# Patient Record
Sex: FEMALE | Race: WHITE | NOT HISPANIC OR LATINO
[De-identification: names, ages, dates, MRNs, and addresses within clinical notes are randomized per-mention and may not be internally consistent; named-entity substitution may affect disease eponyms.]

---

## 2018-02-05 ENCOUNTER — RX RENEWAL (OUTPATIENT)
Age: 53
End: 2018-02-05

## 2018-02-13 ENCOUNTER — RECORD ABSTRACTING (OUTPATIENT)
Age: 53
End: 2018-02-13

## 2018-03-20 ENCOUNTER — APPOINTMENT (OUTPATIENT)
Dept: CARDIOLOGY | Facility: CLINIC | Age: 53
End: 2018-03-20
Payer: MEDICAID

## 2018-03-20 VITALS
HEART RATE: 70 BPM | WEIGHT: 112.31 LBS | SYSTOLIC BLOOD PRESSURE: 120 MMHG | HEIGHT: 61 IN | RESPIRATION RATE: 16 BRPM | BODY MASS INDEX: 21.2 KG/M2 | DIASTOLIC BLOOD PRESSURE: 70 MMHG

## 2018-03-20 DIAGNOSIS — G89.29 DORSALGIA, UNSPECIFIED: ICD-10-CM

## 2018-03-20 DIAGNOSIS — F17.200 NICOTINE DEPENDENCE, UNSPECIFIED, UNCOMPLICATED: ICD-10-CM

## 2018-03-20 DIAGNOSIS — M54.9 DORSALGIA, UNSPECIFIED: ICD-10-CM

## 2018-03-20 PROCEDURE — 93000 ELECTROCARDIOGRAM COMPLETE: CPT

## 2018-03-20 PROCEDURE — 99214 OFFICE O/P EST MOD 30 MIN: CPT

## 2018-03-20 RX ORDER — IBUPROFEN 400 MG/1
400 TABLET, FILM COATED ORAL
Refills: 0 | Status: DISCONTINUED | COMMUNITY
End: 2018-03-20

## 2018-03-20 RX ORDER — METOPROLOL TARTRATE 50 MG/1
50 TABLET, FILM COATED ORAL
Qty: 120 | Refills: 0 | Status: DISCONTINUED | COMMUNITY
End: 2018-03-20

## 2018-06-20 ENCOUNTER — MEDICATION RENEWAL (OUTPATIENT)
Age: 53
End: 2018-06-20

## 2018-10-12 ENCOUNTER — CLINICAL ADVICE (OUTPATIENT)
Age: 53
End: 2018-10-12

## 2019-01-08 ENCOUNTER — RX RENEWAL (OUTPATIENT)
Age: 54
End: 2019-01-08

## 2019-03-27 ENCOUNTER — RECORD ABSTRACTING (OUTPATIENT)
Age: 54
End: 2019-03-27

## 2019-03-27 RX ORDER — CLONAZEPAM 0.5 MG/1
0.5 TABLET ORAL
Refills: 0 | Status: ACTIVE | COMMUNITY

## 2019-03-27 RX ORDER — FENTANYL 87.5 UG/H
PATCH, EXTENDED RELEASE TRANSDERMAL
Refills: 0 | Status: ACTIVE | COMMUNITY

## 2019-03-28 ENCOUNTER — APPOINTMENT (OUTPATIENT)
Dept: CARDIOLOGY | Facility: CLINIC | Age: 54
End: 2019-03-28
Payer: MEDICAID

## 2019-03-28 ENCOUNTER — NON-APPOINTMENT (OUTPATIENT)
Age: 54
End: 2019-03-28

## 2019-03-28 VITALS
SYSTOLIC BLOOD PRESSURE: 115 MMHG | BODY MASS INDEX: 21.52 KG/M2 | RESPIRATION RATE: 16 BRPM | WEIGHT: 114 LBS | DIASTOLIC BLOOD PRESSURE: 80 MMHG | HEIGHT: 61 IN | HEART RATE: 57 BPM

## 2019-03-28 PROCEDURE — 93000 ELECTROCARDIOGRAM COMPLETE: CPT

## 2019-03-28 PROCEDURE — 99214 OFFICE O/P EST MOD 30 MIN: CPT

## 2019-03-28 NOTE — ASSESSMENT
[FreeTextEntry1] : ECG: Normal sinus rhythm at 57 LAE,  beats per minute. Normal axis and intervals. No significant ischemic changes\par \par Laboratory data 3/22/19:\par Cholesterol 210\par HDL 51\par \par A1c 5.7\par Electrolytes LFTs are normal.\par \par Impression\par Symptomatic chronic PVCs.\par Atypical chronic chest pain syndrome.\par Continued smoking.\par Anxiety and depressive disorder.\par No improvement in lipid status with atorvastatin because of extreme inconsistency with dosing.\par \par Plan:\par Discuss with the patient to be imperative for her being consistent with her atorvastatin. We will then repeat the labs in a couple months to see what the efficacy is. This is especially important in view of this and a strong family history of premature CAD.\par Reassured the patient about the benign nature of the atypical musculoskeletal chest pain.\par Continuation of the BB therapy that has been helpful\par We'll schedule an echocardiogram in followup in a few months.

## 2019-03-28 NOTE — HISTORY OF PRESENT ILLNESS
[FreeTextEntry1] : Still grieving the premature death of her 26-year-old son.\par Believes that the metoprolol 50 q.i.d. is helping though not not sufficient to completely control her palpitations. "skipping and racing, usually worse with stress\par No other new active cardiac symptoms.\par As continued baseline exertional shortness of breath and the same atypical musculoskeletal chest pain.

## 2019-03-28 NOTE — PHYSICAL EXAM
[General Appearance - Well Developed] : well developed [Normal Appearance] : normal appearance [Well Groomed] : well groomed [General Appearance - Well Nourished] : well nourished [No Deformities] : no deformities [General Appearance - In No Acute Distress] : no acute distress [Normal Conjunctiva] : the conjunctiva exhibited no abnormalities [Eyelids - No Xanthelasma] : the eyelids demonstrated no xanthelasmas [Normal Oral Mucosa] : normal oral mucosa [No Oral Pallor] : no oral pallor [No Oral Cyanosis] : no oral cyanosis [Normal Jugular Venous A Waves Present] : normal jugular venous A waves present [Normal Jugular Venous V Waves Present] : normal jugular venous V waves present [No Jugular Venous Alva A Waves] : no jugular venous alva A waves [Respiration, Rhythm And Depth] : normal respiratory rhythm and effort [Exaggerated Use Of Accessory Muscles For Inspiration] : no accessory muscle use [Auscultation Breath Sounds / Voice Sounds] : lungs were clear to auscultation bilaterally [Abdomen Soft] : soft [Abdomen Tenderness] : non-tender [Abdomen Mass (___ Cm)] : no abdominal mass palpated [Abnormal Walk] : normal gait [Gait - Sufficient For Exercise Testing] : the gait was sufficient for exercise testing [Nail Clubbing] : no clubbing of the fingernails [Cyanosis, Localized] : no localized cyanosis [Petechial Hemorrhages (___cm)] : no petechial hemorrhages [Skin Color & Pigmentation] : normal skin color and pigmentation [] : no rash [No Venous Stasis] : no venous stasis [Skin Lesions] : no skin lesions [No Skin Ulcers] : no skin ulcer [No Xanthoma] : no  xanthoma was observed [Oriented To Time, Place, And Person] : oriented to person, place, and time [Affect] : the affect was normal [Mood] : the mood was normal [FreeTextEntry1] : very anxious

## 2019-03-28 NOTE — REASON FOR VISIT
[FreeTextEntry1] : Patient returns here for followup with regard to management of problems which include:\par \par 1. Chronic palpitations/PVC\par \par 2. Anxiety and depressive disorder\par \par 3. Chronic smoking\par \par 4. Chronic atypical chest pain syndrome\par

## 2019-05-09 ENCOUNTER — RX CHANGE (OUTPATIENT)
Age: 54
End: 2019-05-09

## 2019-05-09 ENCOUNTER — APPOINTMENT (OUTPATIENT)
Dept: CARDIOLOGY | Facility: CLINIC | Age: 54
End: 2019-05-09
Payer: MEDICAID

## 2019-05-09 PROCEDURE — 93306 TTE W/DOPPLER COMPLETE: CPT

## 2019-07-05 ENCOUNTER — RX RENEWAL (OUTPATIENT)
Age: 54
End: 2019-07-05

## 2019-08-01 ENCOUNTER — APPOINTMENT (OUTPATIENT)
Dept: GASTROENTEROLOGY | Facility: CLINIC | Age: 54
End: 2019-08-01
Payer: MEDICAID

## 2019-08-01 VITALS
SYSTOLIC BLOOD PRESSURE: 125 MMHG | BODY MASS INDEX: 21.71 KG/M2 | DIASTOLIC BLOOD PRESSURE: 80 MMHG | HEART RATE: 67 BPM | HEIGHT: 61 IN | WEIGHT: 115 LBS

## 2019-08-01 DIAGNOSIS — K76.89 OTHER SPECIFIED DISEASES OF LIVER: ICD-10-CM

## 2019-08-01 DIAGNOSIS — H53.2 DIPLOPIA: ICD-10-CM

## 2019-08-01 DIAGNOSIS — K64.9 UNSPECIFIED HEMORRHOIDS: ICD-10-CM

## 2019-08-01 DIAGNOSIS — R51 HEADACHE: ICD-10-CM

## 2019-08-01 DIAGNOSIS — R20.0 ANESTHESIA OF SKIN: ICD-10-CM

## 2019-08-01 DIAGNOSIS — F43.9 REACTION TO SEVERE STRESS, UNSPECIFIED: ICD-10-CM

## 2019-08-01 DIAGNOSIS — M54.2 CERVICALGIA: ICD-10-CM

## 2019-08-01 DIAGNOSIS — H57.10 OCULAR PAIN, UNSPECIFIED EYE: ICD-10-CM

## 2019-08-01 DIAGNOSIS — R10.84 GENERALIZED ABDOMINAL PAIN: ICD-10-CM

## 2019-08-01 DIAGNOSIS — Z72.0 TOBACCO USE: ICD-10-CM

## 2019-08-01 DIAGNOSIS — K58.1 IRRITABLE BOWEL SYNDROME WITH CONSTIPATION: ICD-10-CM

## 2019-08-01 DIAGNOSIS — J30.1 ALLERGIC RHINITIS DUE TO POLLEN: ICD-10-CM

## 2019-08-01 DIAGNOSIS — R42 DIZZINESS AND GIDDINESS: ICD-10-CM

## 2019-08-01 DIAGNOSIS — J34.9 UNSPECIFIED DISORDER OF NOSE AND NASAL SINUSES: ICD-10-CM

## 2019-08-01 DIAGNOSIS — Z80.9 FAMILY HISTORY OF MALIGNANT NEOPLASM, UNSPECIFIED: ICD-10-CM

## 2019-08-01 DIAGNOSIS — R25.1 TREMOR, UNSPECIFIED: ICD-10-CM

## 2019-08-01 DIAGNOSIS — M25.50 PAIN IN UNSPECIFIED JOINT: ICD-10-CM

## 2019-08-01 DIAGNOSIS — K59.00 CONSTIPATION, UNSPECIFIED: ICD-10-CM

## 2019-08-01 DIAGNOSIS — R06.2 WHEEZING: ICD-10-CM

## 2019-08-01 DIAGNOSIS — R35.0 FREQUENCY OF MICTURITION: ICD-10-CM

## 2019-08-01 DIAGNOSIS — F32.9 MAJOR DEPRESSIVE DISORDER, SINGLE EPISODE, UNSPECIFIED: ICD-10-CM

## 2019-08-01 DIAGNOSIS — H53.8 OTHER VISUAL DISTURBANCES: ICD-10-CM

## 2019-08-01 DIAGNOSIS — R10.11 RIGHT UPPER QUADRANT PAIN: ICD-10-CM

## 2019-08-01 DIAGNOSIS — K30 FUNCTIONAL DYSPEPSIA: ICD-10-CM

## 2019-08-01 DIAGNOSIS — K90.49 MALABSORPTION DUE TO INTOLERANCE, NOT ELSEWHERE CLASSIFIED: ICD-10-CM

## 2019-08-01 DIAGNOSIS — L81.8 OTHER SPECIFIED DISORDERS OF PIGMENTATION: ICD-10-CM

## 2019-08-01 DIAGNOSIS — T78.40XA ALLERGY, UNSPECIFIED, INITIAL ENCOUNTER: ICD-10-CM

## 2019-08-01 PROCEDURE — 99203 OFFICE O/P NEW LOW 30 MIN: CPT

## 2019-08-01 RX ORDER — LINACLOTIDE 145 UG/1
145 CAPSULE, GELATIN COATED ORAL
Qty: 30 | Refills: 5 | Status: ACTIVE | COMMUNITY
Start: 2019-08-01 | End: 1900-01-01

## 2019-08-04 PROBLEM — K59.00 CONSTIPATION, UNSPECIFIED CONSTIPATION TYPE: Status: ACTIVE | Noted: 2019-08-01

## 2019-08-04 PROBLEM — F43.9 STRESS: Status: ACTIVE | Noted: 2019-08-01

## 2019-08-04 PROBLEM — R51 HEADACHE: Status: ACTIVE | Noted: 2019-08-01

## 2019-08-04 PROBLEM — T78.40XA ALLERGY: Status: ACTIVE | Noted: 2019-08-01

## 2019-08-04 PROBLEM — H57.10 PAIN IN EYE: Status: ACTIVE | Noted: 2019-08-01

## 2019-08-04 PROBLEM — H53.2 DOUBLE VISION: Status: ACTIVE | Noted: 2019-08-01

## 2019-08-04 PROBLEM — Z80.9 FAMILY HISTORY OF MALIGNANT NEOPLASM: Status: ACTIVE | Noted: 2019-08-01

## 2019-08-04 PROBLEM — L81.8 HISTORY OF TATTOO: Status: ACTIVE | Noted: 2019-08-01

## 2019-08-04 PROBLEM — R06.2 WHEEZING: Status: ACTIVE | Noted: 2019-08-01

## 2019-08-04 PROBLEM — R10.84 GENERALIZED ABDOMINAL PAIN: Status: ACTIVE | Noted: 2019-08-01

## 2019-08-04 PROBLEM — J34.9 SINUS PROBLEM: Status: ACTIVE | Noted: 2019-08-01

## 2019-08-04 PROBLEM — R25.1 TREMORS OF NERVOUS SYSTEM: Status: ACTIVE | Noted: 2019-08-01

## 2019-08-04 PROBLEM — M25.50 JOINT PAIN: Status: ACTIVE | Noted: 2019-08-01

## 2019-08-04 PROBLEM — R20.0 NUMBNESS: Status: ACTIVE | Noted: 2019-08-01

## 2019-08-04 PROBLEM — J30.1 HAY FEVER: Status: ACTIVE | Noted: 2019-08-01

## 2019-08-04 PROBLEM — F32.9 DEPRESSION, UNSPECIFIED DEPRESSION TYPE: Status: ACTIVE | Noted: 2019-08-01

## 2019-08-04 PROBLEM — K30 INDIGESTION: Status: ACTIVE | Noted: 2019-08-01

## 2019-08-04 PROBLEM — R35.0 URINARY FREQUENCY: Status: ACTIVE | Noted: 2019-08-01

## 2019-08-04 PROBLEM — H53.8 BLURRED VISION: Status: ACTIVE | Noted: 2019-08-01

## 2019-08-04 PROBLEM — K90.49 FOOD INTOLERANCE: Status: ACTIVE | Noted: 2019-08-01

## 2019-08-04 PROBLEM — M54.2 NECK PAIN: Status: ACTIVE | Noted: 2019-08-01

## 2019-08-04 PROBLEM — R42 DIZZINESS: Status: ACTIVE | Noted: 2019-08-01

## 2019-08-04 PROBLEM — Z72.0 TOBACCO USE: Status: ACTIVE | Noted: 2019-08-01

## 2019-08-04 PROBLEM — K64.9 HEMORRHOIDS, UNSPECIFIED HEMORRHOID TYPE: Status: ACTIVE | Noted: 2019-08-01

## 2019-08-04 NOTE — REVIEW OF SYSTEMS
[Eyesight Problems] : eyesight problems [Dry Eyes] : no dryness of the eyes [Eyes Itch] : no itching of the eyes [Wheezing] : wheezing [As Noted in HPI] : as noted in HPI [Arthralgias] : arthralgias [Joint Pain] : joint pain [Joint Swelling] : joint swelling [Joint Stiffness] : joint stiffness [Limb Pain] : limb pain [Limb Swelling] : no limb swelling [Negative] : Psychiatric [FreeTextEntry6] : at times

## 2019-08-04 NOTE — HISTORY OF PRESENT ILLNESS
[de-identified] : 54yo female with constipation-predominant IBS\par She has tried mulitple OTC meds but still with issues having BM\par She notes new ruq pain on and off exacerbated by food\par Pain comes and goes, can occur up to few times per week and then resolve by itself after hours with some residual soreness

## 2019-08-04 NOTE — ASSESSMENT
[FreeTextEntry1] : 52yo female with IBS constipation predominant IBS\par \par try linzess - d/w pt in detail and will titrate to response\par Pt counseled regarding possibility of diarrhea\par RUQ pain - will check sono, evaluate for biliary disease

## 2019-08-04 NOTE — PHYSICAL EXAM
[General Appearance - Alert] : alert [General Appearance - In No Acute Distress] : in no acute distress [Sclera] : the sclera and conjunctiva were normal [PERRL With Normal Accommodation] : pupils were equal in size, round, and reactive to light [Extraocular Movements] : extraocular movements were intact [Outer Ear] : the ears and nose were normal in appearance [Oropharynx] : the oropharynx was normal [Auscultation Breath Sounds / Voice Sounds] : lungs were clear to auscultation bilaterally [Heart Rate And Rhythm] : heart rate was normal and rhythm regular [Heart Sounds] : normal S1 and S2 [Heart Sounds Gallop] : no gallops [Murmurs] : no murmurs [Heart Sounds Pericardial Friction Rub] : no pericardial rub [Full Pulse] : the pedal pulses are present [Edema] : there was no peripheral edema [Bowel Sounds] : normal bowel sounds [Abdomen Soft] : soft [Abdomen Tenderness] : non-tender [Abdomen Mass (___ Cm)] : no abdominal mass palpated [Abnormal Walk] : normal gait [Nail Clubbing] : no clubbing  or cyanosis of the fingernails [Musculoskeletal - Swelling] : no joint swelling seen [Motor Tone] : muscle strength and tone were normal [Skin Color & Pigmentation] : normal skin color and pigmentation [Skin Turgor] : normal skin turgor [] : no rash [Oriented To Time, Place, And Person] : oriented to person, place, and time [Impaired Insight] : insight and judgment were intact [Affect] : the affect was normal

## 2019-08-07 ENCOUNTER — RX CHANGE (OUTPATIENT)
Age: 54
End: 2019-08-07

## 2019-09-17 ENCOUNTER — RECORD ABSTRACTING (OUTPATIENT)
Age: 54
End: 2019-09-17

## 2019-09-18 ENCOUNTER — APPOINTMENT (OUTPATIENT)
Dept: CARDIOLOGY | Facility: CLINIC | Age: 54
End: 2019-09-18
Payer: MEDICAID

## 2019-09-18 ENCOUNTER — NON-APPOINTMENT (OUTPATIENT)
Age: 54
End: 2019-09-18

## 2019-09-18 VITALS
BODY MASS INDEX: 22.09 KG/M2 | HEART RATE: 61 BPM | SYSTOLIC BLOOD PRESSURE: 105 MMHG | HEIGHT: 61 IN | OXYGEN SATURATION: 98 % | WEIGHT: 117 LBS | RESPIRATION RATE: 16 BRPM | DIASTOLIC BLOOD PRESSURE: 70 MMHG

## 2019-09-18 PROCEDURE — 99214 OFFICE O/P EST MOD 30 MIN: CPT

## 2019-09-18 PROCEDURE — 93000 ELECTROCARDIOGRAM COMPLETE: CPT

## 2019-09-18 NOTE — PHYSICAL EXAM
[General Appearance - Well Developed] : well developed [Normal Appearance] : normal appearance [Well Groomed] : well groomed [General Appearance - Well Nourished] : well nourished [No Deformities] : no deformities [General Appearance - In No Acute Distress] : no acute distress [Normal Conjunctiva] : the conjunctiva exhibited no abnormalities [Eyelids - No Xanthelasma] : the eyelids demonstrated no xanthelasmas [Normal Oral Mucosa] : normal oral mucosa [No Oral Pallor] : no oral pallor [No Oral Cyanosis] : no oral cyanosis [Normal Jugular Venous A Waves Present] : normal jugular venous A waves present [No Jugular Venous Alva A Waves] : no jugular venous alva A waves [Normal Jugular Venous V Waves Present] : normal jugular venous V waves present [Exaggerated Use Of Accessory Muscles For Inspiration] : no accessory muscle use [Respiration, Rhythm And Depth] : normal respiratory rhythm and effort [Auscultation Breath Sounds / Voice Sounds] : lungs were clear to auscultation bilaterally [Abdomen Soft] : soft [Abdomen Tenderness] : non-tender [Abnormal Walk] : normal gait [Abdomen Mass (___ Cm)] : no abdominal mass palpated [Gait - Sufficient For Exercise Testing] : the gait was sufficient for exercise testing [Nail Clubbing] : no clubbing of the fingernails [Cyanosis, Localized] : no localized cyanosis [Petechial Hemorrhages (___cm)] : no petechial hemorrhages [Skin Color & Pigmentation] : normal skin color and pigmentation [] : no rash [No Venous Stasis] : no venous stasis [Skin Lesions] : no skin lesions [No Skin Ulcers] : no skin ulcer [No Xanthoma] : no  xanthoma was observed [Oriented To Time, Place, And Person] : oriented to person, place, and time [Affect] : the affect was normal [Mood] : the mood was normal [FreeTextEntry1] : very anxious

## 2019-09-18 NOTE — ASSESSMENT
[FreeTextEntry1] : ECG: Normal sinus rhythm at 61 , LVH No significant ischemic changes\par \par \par Echocardiogram 5/9/19\par EF 60-65%\par Normal LV function\par False tendon in the LV apical cavity\par \par \par \par Laboratory data       3/22/19              5/8/19\par Cholesterol                  210                     145\par HDL                               51                      48\par LDL                              141                     84\par A1c                               5.7\par Electrolytes LFTs are normal.\par \par Impression\par -Minimally symptomatic chronic PVCs and palpitations.\par -Atypical chronic chest pain syndrome.\par -Continued smoking and aware of the consequences. \par -Anxiety and depressive disorder.\par -Much improvement noted in her lipid panel with the use of Atorvastatin 10 mg QD.\par -Blood pressure today 105/70.\par - Echo stable with mild valvular insufficiences\par \par Plan:\par 1.Discussed with the patient to be imperative for her being consistent with her atorvastatin. \par 2.Reassured the patient about the benign nature of the atypical musculoskeletal chest pain.\par 3.Continuation of the BB therapy \par 4. Smoking cessation\par 5. Repeat blood work to reassess lipids in 4 months\par \par Clinical follow up in 6 months \par

## 2019-09-18 NOTE — HISTORY OF PRESENT ILLNESS
[FreeTextEntry1] : No other new active cardiac symptoms.\par \par Continues to smoke daily less than 0.5 PPD, now being managed by pulm. \par \par Newly diagnosed with rheumatoid arthritis and osteoporosis.\par \par As continued baseline exertional shortness of breath and the same atypical musculoskeletal chest pain.\par \par States Inderal improved her palpitations better then current Metoprolol. She is experiencing several episodes during the day with some minor SOB and lightheadedness. Inderal changed in 2014.\par \par Her lightheadedness seems to happen with rapid change in position as she describes it. Has attempted to monitor her blood pressures soon after with no abnormal readings. Explained blood pressure readings can be transient.\par \par Denies edema or chest discomfort at rest or exertion.

## 2020-01-29 ENCOUNTER — RX RENEWAL (OUTPATIENT)
Age: 55
End: 2020-01-29

## 2020-01-29 RX ORDER — ATORVASTATIN CALCIUM 10 MG/1
10 TABLET, FILM COATED ORAL
Qty: 90 | Refills: 3 | Status: ACTIVE | COMMUNITY
Start: 2018-10-12 | End: 1900-01-01

## 2020-03-19 ENCOUNTER — APPOINTMENT (OUTPATIENT)
Dept: CARDIOLOGY | Facility: CLINIC | Age: 55
End: 2020-03-19

## 2020-06-10 ENCOUNTER — RX RENEWAL (OUTPATIENT)
Age: 55
End: 2020-06-10

## 2020-06-18 NOTE — PHYSICAL EXAM
[General Appearance - Well Developed] : well developed [Normal Appearance] : normal appearance [General Appearance - Well Nourished] : well nourished [Well Groomed] : well groomed [No Deformities] : no deformities [General Appearance - In No Acute Distress] : no acute distress [Eyelids - No Xanthelasma] : the eyelids demonstrated no xanthelasmas [Normal Conjunctiva] : the conjunctiva exhibited no abnormalities [Normal Oral Mucosa] : normal oral mucosa [No Oral Pallor] : no oral pallor [No Oral Cyanosis] : no oral cyanosis [Normal Jugular Venous A Waves Present] : normal jugular venous A waves present [Normal Jugular Venous V Waves Present] : normal jugular venous V waves present [No Jugular Venous Alva A Waves] : no jugular venous alva A waves [Respiration, Rhythm And Depth] : normal respiratory rhythm and effort [Exaggerated Use Of Accessory Muscles For Inspiration] : no accessory muscle use [Auscultation Breath Sounds / Voice Sounds] : lungs were clear to auscultation bilaterally [Abdomen Soft] : soft [Abdomen Tenderness] : non-tender [Abnormal Walk] : normal gait [Abdomen Mass (___ Cm)] : no abdominal mass palpated [Gait - Sufficient For Exercise Testing] : the gait was sufficient for exercise testing [Nail Clubbing] : no clubbing of the fingernails [Cyanosis, Localized] : no localized cyanosis [Petechial Hemorrhages (___cm)] : no petechial hemorrhages [Skin Color & Pigmentation] : normal skin color and pigmentation [No Venous Stasis] : no venous stasis [] : no rash [Skin Lesions] : no skin lesions [No Skin Ulcers] : no skin ulcer [No Xanthoma] : no  xanthoma was observed [Oriented To Time, Place, And Person] : oriented to person, place, and time [Affect] : the affect was normal [Mood] : the mood was normal [FreeTextEntry1] : very anxious

## 2020-06-22 ENCOUNTER — APPOINTMENT (OUTPATIENT)
Dept: CARDIOLOGY | Facility: CLINIC | Age: 55
End: 2020-06-22

## 2020-06-22 RX ORDER — CLONAZEPAM 0.5 MG/1
0.5 TABLET ORAL
Refills: 0 | Status: DISCONTINUED | COMMUNITY
End: 2020-06-22

## 2020-06-22 NOTE — PHYSICAL EXAM
[General Appearance - Well Developed] : well developed [Normal Appearance] : normal appearance [Well Groomed] : well groomed [No Deformities] : no deformities [General Appearance - Well Nourished] : well nourished [General Appearance - In No Acute Distress] : no acute distress [Eyelids - No Xanthelasma] : the eyelids demonstrated no xanthelasmas [Normal Conjunctiva] : the conjunctiva exhibited no abnormalities [No Oral Pallor] : no oral pallor [Normal Oral Mucosa] : normal oral mucosa [No Oral Cyanosis] : no oral cyanosis [Normal Jugular Venous V Waves Present] : normal jugular venous V waves present [Normal Jugular Venous A Waves Present] : normal jugular venous A waves present [Respiration, Rhythm And Depth] : normal respiratory rhythm and effort [No Jugular Venous Alva A Waves] : no jugular venous alva A waves [Auscultation Breath Sounds / Voice Sounds] : lungs were clear to auscultation bilaterally [Exaggerated Use Of Accessory Muscles For Inspiration] : no accessory muscle use [Abdomen Soft] : soft [Abdomen Mass (___ Cm)] : no abdominal mass palpated [Abdomen Tenderness] : non-tender [Gait - Sufficient For Exercise Testing] : the gait was sufficient for exercise testing [Abnormal Walk] : normal gait [Petechial Hemorrhages (___cm)] : no petechial hemorrhages [Cyanosis, Localized] : no localized cyanosis [Nail Clubbing] : no clubbing of the fingernails [Skin Color & Pigmentation] : normal skin color and pigmentation [No Venous Stasis] : no venous stasis [Skin Lesions] : no skin lesions [] : no rash [Oriented To Time, Place, And Person] : oriented to person, place, and time [No Xanthoma] : no  xanthoma was observed [No Skin Ulcers] : no skin ulcer [Mood] : the mood was normal [Affect] : the affect was normal [FreeTextEntry1] : very anxious

## 2020-06-23 RX ORDER — ABALOPARATIDE 2000 UG/ML
3120 INJECTION, SOLUTION SUBCUTANEOUS
Refills: 0 | Status: ACTIVE | COMMUNITY

## 2020-06-24 ENCOUNTER — APPOINTMENT (OUTPATIENT)
Dept: CARDIOLOGY | Facility: CLINIC | Age: 55
End: 2020-06-24

## 2020-06-24 ENCOUNTER — APPOINTMENT (OUTPATIENT)
Dept: CARDIOLOGY | Facility: CLINIC | Age: 55
End: 2020-06-24
Payer: MEDICAID

## 2020-06-24 ENCOUNTER — NON-APPOINTMENT (OUTPATIENT)
Age: 55
End: 2020-06-24

## 2020-06-24 VITALS
RESPIRATION RATE: 16 BRPM | WEIGHT: 115 LBS | HEART RATE: 62 BPM | BODY MASS INDEX: 21.71 KG/M2 | SYSTOLIC BLOOD PRESSURE: 111 MMHG | DIASTOLIC BLOOD PRESSURE: 71 MMHG | HEIGHT: 61 IN

## 2020-06-24 DIAGNOSIS — Z00.00 ENCOUNTER FOR GENERAL ADULT MEDICAL EXAMINATION W/OUT ABNORMAL FINDINGS: ICD-10-CM

## 2020-06-24 PROCEDURE — 93000 ELECTROCARDIOGRAM COMPLETE: CPT

## 2020-06-24 PROCEDURE — 99214 OFFICE O/P EST MOD 30 MIN: CPT

## 2020-06-24 RX ORDER — BUPROPION HYDROCHLORIDE 150 MG/1
150 TABLET, EXTENDED RELEASE ORAL
Qty: 30 | Refills: 0 | Status: DISCONTINUED | COMMUNITY
Start: 2020-01-22 | End: 2020-06-24

## 2020-06-24 RX ORDER — BUPROPION HCL 150 MG
150 TABLET,SUSTAINED-RELEASE 12 HR ORAL
Refills: 0 | Status: DISCONTINUED | COMMUNITY
End: 2020-06-24

## 2020-06-26 NOTE — PHYSICAL EXAM
[General Appearance - Well Developed] : well developed [Well Groomed] : well groomed [Normal Appearance] : normal appearance [No Deformities] : no deformities [General Appearance - In No Acute Distress] : no acute distress [General Appearance - Well Nourished] : well nourished [Eyelids - No Xanthelasma] : the eyelids demonstrated no xanthelasmas [Normal Oral Mucosa] : normal oral mucosa [Normal Conjunctiva] : the conjunctiva exhibited no abnormalities [No Oral Cyanosis] : no oral cyanosis [No Oral Pallor] : no oral pallor [Normal Jugular Venous A Waves Present] : normal jugular venous A waves present [Normal Jugular Venous V Waves Present] : normal jugular venous V waves present [No Jugular Venous Alva A Waves] : no jugular venous alva A waves [Auscultation Breath Sounds / Voice Sounds] : lungs were clear to auscultation bilaterally [Respiration, Rhythm And Depth] : normal respiratory rhythm and effort [Exaggerated Use Of Accessory Muscles For Inspiration] : no accessory muscle use [Abdomen Soft] : soft [Abdomen Tenderness] : non-tender [Abdomen Mass (___ Cm)] : no abdominal mass palpated [Gait - Sufficient For Exercise Testing] : the gait was sufficient for exercise testing [Abnormal Walk] : normal gait [Nail Clubbing] : no clubbing of the fingernails [Petechial Hemorrhages (___cm)] : no petechial hemorrhages [Cyanosis, Localized] : no localized cyanosis [Skin Color & Pigmentation] : normal skin color and pigmentation [No Venous Stasis] : no venous stasis [] : no rash [No Skin Ulcers] : no skin ulcer [No Xanthoma] : no  xanthoma was observed [Skin Lesions] : no skin lesions [Mood] : the mood was normal [Oriented To Time, Place, And Person] : oriented to person, place, and time [Affect] : the affect was normal [FreeTextEntry1] : very anxious

## 2021-01-19 ENCOUNTER — APPOINTMENT (OUTPATIENT)
Dept: CARDIOLOGY | Facility: CLINIC | Age: 56
End: 2021-01-19
Payer: MEDICAID

## 2021-01-19 VITALS
RESPIRATION RATE: 16 BRPM | HEIGHT: 61 IN | TEMPERATURE: 96.5 F | WEIGHT: 115 LBS | DIASTOLIC BLOOD PRESSURE: 62 MMHG | OXYGEN SATURATION: 98 % | BODY MASS INDEX: 21.71 KG/M2 | HEART RATE: 68 BPM | SYSTOLIC BLOOD PRESSURE: 104 MMHG

## 2021-01-19 DIAGNOSIS — R00.2 PALPITATIONS: ICD-10-CM

## 2021-01-19 DIAGNOSIS — F41.9 ANXIETY DISORDER, UNSPECIFIED: ICD-10-CM

## 2021-01-19 PROCEDURE — 99214 OFFICE O/P EST MOD 30 MIN: CPT

## 2021-01-19 PROCEDURE — 99072 ADDL SUPL MATRL&STAF TM PHE: CPT

## 2021-01-19 PROCEDURE — 93000 ELECTROCARDIOGRAM COMPLETE: CPT

## 2021-01-19 RX ORDER — TIZANIDINE 2 MG/1
2 TABLET ORAL
Qty: 60 | Refills: 0 | Status: ACTIVE | COMMUNITY
Start: 2020-09-28

## 2021-01-19 RX ORDER — PEN NEEDLE, DIABETIC 29 G X1/2"
31G X 5 MM NEEDLE, DISPOSABLE MISCELLANEOUS
Qty: 100 | Refills: 0 | Status: ACTIVE | COMMUNITY
Start: 2020-11-09

## 2021-01-19 NOTE — PHYSICAL EXAM
[Normal Appearance] : normal appearance [General Appearance - Well Developed] : well developed [Well Groomed] : well groomed [General Appearance - Well Nourished] : well nourished [No Deformities] : no deformities [General Appearance - In No Acute Distress] : no acute distress [Normal Conjunctiva] : the conjunctiva exhibited no abnormalities [Eyelids - No Xanthelasma] : the eyelids demonstrated no xanthelasmas [Normal Oral Mucosa] : normal oral mucosa [No Oral Pallor] : no oral pallor [No Oral Cyanosis] : no oral cyanosis [Normal Jugular Venous A Waves Present] : normal jugular venous A waves present [No Jugular Venous Alva A Waves] : no jugular venous alva A waves [Normal Jugular Venous V Waves Present] : normal jugular venous V waves present [Respiration, Rhythm And Depth] : normal respiratory rhythm and effort [Exaggerated Use Of Accessory Muscles For Inspiration] : no accessory muscle use [Auscultation Breath Sounds / Voice Sounds] : lungs were clear to auscultation bilaterally [Abdomen Soft] : soft [Abdomen Tenderness] : non-tender [Abdomen Mass (___ Cm)] : no abdominal mass palpated [Abnormal Walk] : normal gait [Gait - Sufficient For Exercise Testing] : the gait was sufficient for exercise testing [Nail Clubbing] : no clubbing of the fingernails [Cyanosis, Localized] : no localized cyanosis [Petechial Hemorrhages (___cm)] : no petechial hemorrhages [Skin Color & Pigmentation] : normal skin color and pigmentation [] : no rash [No Venous Stasis] : no venous stasis [Skin Lesions] : no skin lesions [No Skin Ulcers] : no skin ulcer [No Xanthoma] : no  xanthoma was observed [Affect] : the affect was normal [Oriented To Time, Place, And Person] : oriented to person, place, and time [Mood] : the mood was normal [FreeTextEntry1] : very anxious

## 2021-01-19 NOTE — HISTORY OF PRESENT ILLNESS
[FreeTextEntry1] : No other new active cardiac symptoms.\par \par Continues to smoke daily less than 0.5 PPD, now being managed by pulm. \par \par Newly diagnosed with rheumatoid arthritis and osteoporosis.\par Patient had a recent MRI of the chest that suggested a diagnosis of sternoclavicular  hyperostosis\par This was made after she had presented with some swelling and pain of her chest wall.\par \par As continued baseline exertional shortness of breath and the same atypical musculoskeletal chest pain.\par \par States Inderal improved her palpitations better then current Metoprolol. She is experiencing several episodes during the day with some minor SOB and lightheadedness. Inderal changed in 2014.\par She still feels that the metoprolol does not arrest her palpitations as well as Inderal had.  Nonetheless, seems to be resigned to continuing it and does not want to change.\par \par Denies edema or chest discomfort at rest or exertion.

## 2021-01-19 NOTE — ASSESSMENT
[FreeTextEntry1] : ECG: Normal sinus rhythm at 68  ,  No significant ischemic changes\par \par Chest MRI abnormalities of the sternal manubrium and left inferior corner of the sternal body\par \par Echocardiogram 5/9/19\par EF 60-65%\par Normal LV function\par False tendon in the LV apical cavity\par \par \par \par Laboratory data       3/22/19              5/8/19\par Cholesterol                  210                     145\par HDL                               51                      48\par LDL                              141                     84\par A1c                               5.7\par Electrolytes LFTs are normal.\par \par Impression\par -Minimally symptomatic chronic PVCs and palpitations.\par -Atypical chronic chest pain syndrome.  New potential diagnosis as per above\par -Continued smoking and aware of the consequences. \par -Anxiety and depressive disorder.\par -There is no recent lipid panel to review.  Remains on atorvastatin 10 mg QD.\par -Blood pressure stable on metoprolol.\par - Echo stable with mild valvular insufficiences\par \par Plan:\par 1.Discussed with the patient to be imperative for her being consistent with her atorvastatin. \par    We will try to get copies of most recent lipid panel to review\par 2.Reassured the patient about the benign nature of the atypical musculoskeletal chest pain.\par 3.Continuation of the BB therapy \par 4. Smoking cessation\par 5. Repeat blood work to reassess lipids in 4 months\par \par Clinical follow up in 6 months \par

## 2021-06-29 ENCOUNTER — APPOINTMENT (OUTPATIENT)
Dept: CARDIOLOGY | Facility: CLINIC | Age: 56
End: 2021-06-29
Payer: MEDICAID

## 2021-06-29 VITALS
HEART RATE: 59 BPM | SYSTOLIC BLOOD PRESSURE: 124 MMHG | DIASTOLIC BLOOD PRESSURE: 80 MMHG | HEIGHT: 61 IN | RESPIRATION RATE: 16 BRPM | BODY MASS INDEX: 20.39 KG/M2 | WEIGHT: 108 LBS

## 2021-06-29 DIAGNOSIS — R07.9 CHEST PAIN, UNSPECIFIED: ICD-10-CM

## 2021-06-29 DIAGNOSIS — M94.0 CHONDROCOSTAL JUNCTION SYNDROME [TIETZE]: ICD-10-CM

## 2021-06-29 DIAGNOSIS — I34.0 NONRHEUMATIC MITRAL (VALVE) INSUFFICIENCY: ICD-10-CM

## 2021-06-29 DIAGNOSIS — E78.5 HYPERLIPIDEMIA, UNSPECIFIED: ICD-10-CM

## 2021-06-29 DIAGNOSIS — I49.3 VENTRICULAR PREMATURE DEPOLARIZATION: ICD-10-CM

## 2021-06-29 PROCEDURE — 99072 ADDL SUPL MATRL&STAF TM PHE: CPT

## 2021-06-29 PROCEDURE — 93000 ELECTROCARDIOGRAM COMPLETE: CPT

## 2021-06-29 PROCEDURE — 99214 OFFICE O/P EST MOD 30 MIN: CPT

## 2021-06-29 NOTE — ASSESSMENT
[FreeTextEntry1] : ECG: Normal sinus rhythm at 59 BPM ,  No significant ischemic changes\par \par Chest MRI abnormalities of the sternal manubrium and left inferior corner of the sternal body\par \par Echocardiogram 5/9/19\par EF 60-65%\par Normal LV function\par False tendon in the LV apical cavity\par \par \par \par Laboratory data       3/22/19              5/8/19\par Cholesterol                  210                     145\par HDL                               51                      48\par LDL                              141                     84\par A1c                               5.7\par Electrolytes LFTs are normal.\par \par Impression\par -Minimally symptomatic chronic PVCs and palpitations.\par -Atypical chronic chest pain syndrome.  New potential diagnosis as per above\par -Continued smoking and aware of the consequences. \par -Anxiety and depressive disorder.\par -There is no recent lipid panel to review.  Remains on atorvastatin 10 mg QD.\par -Blood pressure stable on lower dose of metoprolol.\par - Echo stable with mild valvular insufficiences\par \par Plan:\par 1.Discussed with the patient to be imperative for her being consistent with her atorvastatin. \par    Prescription for repeat blood work was provided\par 2.Reassured the patient about the benign nature of the atypical musculoskeletal chest pain.\par 3.Continuation of the BB reduced dose 50 twice daily.  New prescription placed\par 4. Smoking cessation\par \par Clinical follow up in 6 months \par

## 2021-06-29 NOTE — HISTORY OF PRESENT ILLNESS
[FreeTextEntry1] : No other new active cardiac symptoms.\par \par Continues to smoke daily less than 0.5 PPD, now being managed by pulm. \par \par Newly diagnosed with rheumatoid arthritis and osteoporosis.\par \par Patient had a recent MRI of the chest that suggested a diagnosis of sternoclavicular  hyperostosis\par This was made after she had presented with some swelling and pain of her chest wall.\par \par As continued baseline exertional shortness of breath and the same atypical musculoskeletal chest pain.\par \par States Inderal improved her palpitations better then current Metoprolol. She is experiencing several episodes during the day with some minor SOB and lightheadedness. Inderal changed in 2014.\par Patient was hospitalized at [facility] for intractable IBS symptoms.  While there she had noticed that her heart rate was in the 40s and her metoprolol was cut from 100 to 50 mg a day.  She has been cutting pills in half since then and having fewer episodes of dizziness the palpitations are unchanged.\par \par Denies edema or chest discomfort at rest or exertion.

## 2021-07-15 ENCOUNTER — NON-APPOINTMENT (OUTPATIENT)
Age: 56
End: 2021-07-15

## 2021-07-15 RX ORDER — METOPROLOL SUCCINATE 50 MG/1
50 TABLET, EXTENDED RELEASE ORAL TWICE DAILY
Qty: 180 | Refills: 3 | Status: DISCONTINUED | COMMUNITY
Start: 2018-03-20 | End: 2021-07-15

## 2021-12-28 ENCOUNTER — APPOINTMENT (OUTPATIENT)
Dept: CARDIOLOGY | Facility: CLINIC | Age: 56
End: 2021-12-28

## 2022-04-22 ENCOUNTER — RX RENEWAL (OUTPATIENT)
Age: 57
End: 2022-04-22

## 2022-04-25 ENCOUNTER — RX RENEWAL (OUTPATIENT)
Age: 57
End: 2022-04-25

## 2022-04-25 RX ORDER — METOPROLOL SUCCINATE 100 MG/1
100 TABLET, EXTENDED RELEASE ORAL DAILY
Qty: 90 | Refills: 2 | Status: ACTIVE | COMMUNITY
Start: 2021-07-15 | End: 1900-01-01